# Patient Record
Sex: FEMALE | Race: WHITE | Employment: FULL TIME | URBAN - METROPOLITAN AREA
[De-identification: names, ages, dates, MRNs, and addresses within clinical notes are randomized per-mention and may not be internally consistent; named-entity substitution may affect disease eponyms.]

---

## 2018-10-22 ENCOUNTER — HOSPITAL ENCOUNTER (OUTPATIENT)
Dept: PHYSICAL THERAPY | Age: 48
Discharge: HOME | End: 2018-10-22
Payer: COMMERCIAL

## 2018-10-22 PROCEDURE — 97162 PT EVAL MOD COMPLEX 30 MIN: CPT

## 2018-10-22 PROCEDURE — 97110 THERAPEUTIC EXERCISES: CPT

## 2018-10-22 NOTE — PROGRESS NOTES
[address] PHYSICAL THERAPY 
[address] [place] [address] [address],Sara Mohamud, [address] [facility] [address] - Phone: [phone]  Fax: [phone] PLAN OF CARE / STATEMENT OF MEDICAL NECESSITY FOR PHYSICAL THERAPY SERVICES Patient Name: Leni Soulier : 1970 Medical  
Diagnosis: B Plantar Fasciitis, R Achilles Treatment Diagnosis: Pain in both feet [M79.671, M79.672], R Achilles pain Onset Date: Chronic Referral Source: Tess Lundborg Start of Care [facility]): 10/22/2018 Prior Hospitalization: See medical history Provider #: [id number] Prior Level of Function: Complete prolonged amb, standing, stair negotiation and amb on uneven surfaces without B foot or R ankle pain or difficulty Comorbidities: Varicose Veins Medications: Verified on Patient Summary List  
The Plan of Care and following information is based on the information from the initial evaluation.  
=========================================================================================== Assessment / key information:  Pt is a 51 y/o female reporting B foot pain and R achilles pain rated 2-10/10 that has been chronic for the past 2 years. Pt reports B foot pain began with insidious onset, but R achilles pain started in May/2018 after excessive stretching of gastroc/soleus on step. Pt also reports flare up in R achilles pain 6 weeks ago after running after a person. Pt reports MD has done x-rays in past year without signs of Fx or bone spurs. Pt denies MRI. Pt reports MD has diagnosed her with microtears in R achilles. Pt reports constant bump on distal R achilles and intermittent swelling in B feet. Pt reports varicose veins L tibial crest. Pt denies LE numbness/tingling. Pt reports MD recommended firm orthotics which she wears in all shoes. Pt reports MD also recommended pt wear CAMboot on R, but she is unable to wear at work. Pt reports she wears CAMboot mostly around her house.  Pt reports sleeping in [place] B at night to decrease pain and allow her to sleep. Pain in B plantar surface of feet and R achilles increases with prolonged positioning in non weight bearing and then stand, prolonged standing/amb, stair negotiation and pressure on R achilles; decreases with ice, meds and injections. Pt reports she has had multiple injections in feet in past with improvement in pain/sxs, but she can't get any more injections at this time. Pt reports working for [employer] and completes prolonged standing/amb 5-6 days per week. Pt reports wearing boots with orthotics to work. Pt denies falls or red flags. Pt demonstrates antalgic gait with decreased stance R, reciprocal stair ascending, non reciprocal stair descending (unable to complete descent with R foot on step), increased R ankle swelling/circumferential measurements, L ankle AROM (4 DF knee ext, 12 DF knee flex, 47 PF, 22 INV, 6 EV), L ankle DF PROM (10 degrees knee ext, 14 degrees knee flex), R ankle AROM (9 DF knee ext, 11 DF knee flex, 46 PF, 19 INV, 4 EV), R ankle DF PROM (15 knee ext, 14 knee flex), great toe ext PROM (78 L, 82 R), TTP B medial longitudinal arch, plantar fascia, post tib and R achilles tendon, decreased R>L great toe strength, decreased R>L ankle strength, 2 reps R SL HR before pain occurs, 17 reps L SL HR before compensations occurs, and decreased functional mobility. FOTO Score: 47/100 
=========================================================================================== Eval Complexity: History MEDIUM  Complexity : 1-2 comorbidities / personal factors will impact the outcome/ POC ;  Examination  HIGH Complexity : 4+ Standardized tests and measures addressing body structure, function, activity limitation and / or participation in recreation ; Presentation MEDIUM Complexity : Evolving with changing characteristics ;   Decision Making MEDIUM Complexity : FOTO score of 26-74; Overall Complexity MEDIUM 
 Problem List: pain affecting function, decrease ROM, decrease strength, edema affecting function, impaired gait/ balance, decrease ADL/ functional abilitiies, decrease activity tolerance, decrease flexibility/ joint mobility and decrease transfer abilities Treatment Plan may include any combination of the following: Therapeutic exercise, Therapeutic activities, Neuromuscular re-education, Physical agent/modality, Gait/balance training, Manual therapy, Aquatic therapy, Patient education, Self Care training, Functional mobility training, Home safety training, Stair training and Other: Dry Needling prn Patient / Family readiness to learn indicated by: asking questions, trying to perform skills and interest 
Persons(s) to be included in education: patient (P) Barriers to Learning/Limitations: None Measures taken:   
Patient Goal (s): \"Be pain free\" Patient self reported health status: good Rehabilitation Potential: good ? Short Term Goals: To be accomplished in  2  weeks: 1. Pt to demonstrate independence with HEP to improve foot intrinsic/great toe/ankle strength for amb, standing. 2. Pt to report 25% or > decrease in max pain levels to improve functional mobility for amb, standing and stair negotiation. ? Long Term Goals: To be accomplished in  4-6  weeks: 1. Pt to report 61/100 or > on FOTO scores to improve functional mobility for amb, standing and stair negotiation. 2. Pt to demonstrate 20 reps SL HR B to improve ankle strength for amb and stair negotiation. 3. Pt to demonstrate 10 reps 6 inch lat tap down B with good mechanics, no pain increase to improve ankle strength/stability for reciprocal descending of stairs. Frequency / Duration:   Patient to be seen  2  times per week for 4-6  weeks:  Pt reports she will be out of town for 1 week due to family commitment. Patient / Caregiver education and instruction: self care, activity modification and exercises G-Codes (GP): N/A 
 Therapist Signature: Quang Day PT Date: 10/22/2018 Certification Period: N/A Time: 9:59 AM  
=========================================================================================== I certify that the above Physical Therapy Services are being furnished while the patient is under my care. I agree with the treatment plan and certify that this therapy is necessary. Physician Signature:       Date:      Time:  Please sign and return to [facility] at [facility], [facility]. or you may fax the signed copy to [phone]. Thank you.

## 2018-10-22 NOTE — PROGRESS NOTES
PHYSICAL THERAPY - DAILY TREATMENT NOTE Patient Name: Анна Parker        Date: 10/22/2018 : 1970   yes Patient  Verified Visit #:      of   12  Insurance: Payor: BLUE CROSS / Plan: [facility] BLUE CROSS FEDERAL / Product Type: PPO / In time: 9:03 Out time: 9:59 Total Treatment Time: 64 Medicare/BCBS Time Tracking (below) Total Timed Codes (min):  15 1:1 Treatment Time:  64 TREATMENT AREA =  Pain in both feet [M79.671, M79.672] SUBJECTIVE Pain Level (on 0 to 10 scale):  3  / 10 Medication Changes/New allergies or changes in medical history, any new surgeries or procedures?    no  If yes, update Summary List  
Subjective Functional Status/Changes:  []  No changes reported See initial eval  
 
  
 
OBJECTIVE 15 min Therapeutic Exercise:  [x]  See flow sheet Rationale:      increase strength to improve the patients ability to complete amb, standing, stair negotiation. N/A min Patient Education:  yes  Reviewed HEP []  Progressed/Changed HEP based on:  Pt instructed on and given HEP to be completed daily. Reviewed importance of supportive footwear and using CAMboot as instructed by MD on R. Pt educated on red flags and to seek immediate medical attention/911 if those occur. Pt instructed to avoid plyometrics/running, ballistic or forceful stretching and amb on uneven surfaces. Pt instructed to negotiate stairs with non reciprocal pattern at this time. Pt instructed to ice prn 10-15 minutes on /1 hour off. Reviewed POC and goals. Pt reports understanding. Other Objective/Functional Measures: 
 
See initial eval 
  
Post Treatment Pain Level (on 0 to 10) scale:   2   10 ASSESSMENT Assessment/Changes in Function:  
 
See initial eval 
  
[]  See Progress Note/Recertification Patient will continue to benefit from skilled PT services to see initial eval  
Progress toward goals / Updated goals: 
Pt denied sharp pains or red flags with initial eval or therapeutic ex. See initial eval.  
 
PLAN [x]  Upgrade activities as tolerated yes Continue plan of care  
[]  Discharge due to :   
[x]  Other: PT 2x/week for 4-6 weeks. Therapist: Adan Terrazas PT Date: 10/22/2018 Time: 9:59 AM  
 
Future Appointments Date Time Provider Ezequiel Mccarthy 10/24/2018  3:00 PM Qian [facility]  
10/30/2018 10:30 AM Qian Terrykatie [facility]  
11/2/2018  3:30 PM Karlene Anthony, JODIE [facility]  
11/5/2018 10:00 AM Claus Huynh PTA [facility]  
11/6/2018  4:00 PM Karlene Anthony PT [facility]

## 2018-10-24 ENCOUNTER — HOSPITAL ENCOUNTER (OUTPATIENT)
Dept: PHYSICAL THERAPY | Age: 48
Discharge: HOME | End: 2018-10-24
Payer: COMMERCIAL

## 2018-10-24 PROCEDURE — 97110 THERAPEUTIC EXERCISES: CPT

## 2018-10-24 PROCEDURE — 97140 MANUAL THERAPY 1/> REGIONS: CPT

## 2018-10-24 NOTE — PROGRESS NOTES
PHYSICAL THERAPY - DAILY TREATMENT NOTE Patient Name: Анна Parker        Date: 10/24/2018 : 1970   YES Patient  Verified Visit #:   2   of   12  Insurance: Payor: BLUE CROSS / [facility] / Product Type: PPO / In time: 3 Out time: 350 Total Treatment Time: 50 Medicare Time Tracking (below) Total Timed Codes (min):  50 1:1 Treatment Time:    
TREATMENT AREA =  Pain in both feet [M79.671, M79.672] SUBJECTIVE Pain Level (on 0 to 10 scale):  3  / 10 Medication Changes/New allergies or changes in medical history, any new surgeries or procedures? NO    If yes, update Summary List  
Subjective Functional Status/Changes:  []  No changes reported \"Don't touch my (R) achilles. \"  Pt reporting mild pain in the bottom of both feet, but a lot of pain in the (R) achilles - especially to touch. OBJECTIVE 20 min Therapeutic Exercise:  [x]  See flow sheet Rationale:      increase ROM, increase strength, improve coordination and improve balance to improve the patients ability to perform pain free ADLs. 30 Min Manual Therapy: STM/DTM (B) gastroc/soleus, plantar fascia. TC distraction. Rationale:      decrease pain, increase ROM, increase tissue extensibility and decrease trigger points to improve patient's ability to perform pain free ADLs. min Patient Education:  YES  Reviewed HEP []  Progressed/Changed HEP based on: Other Objective/Functional Measures: Therex per flow sheet. Added FHB disassociation to improve LE coordination and strength for ADLs. Post Treatment Pain Level (on 0 to 10) scale:   2  / 10 ASSESSMENT Assessment/Changes in Function: No exacerbation of symptoms with today's session. []  See Progress Note/Recertification Patient will continue to benefit from skilled PT services to modify and progress therapeutic interventions, address functional mobility deficits, address ROM deficits, address strength deficits, analyze and address soft tissue restrictions, analyze and cue movement patterns, analyze and modify body mechanics/ergonomics and assess and modify postural abnormalities to attain remaining goals. Progress toward goals / Updated goals: 
Initiated therex. PLAN [x]  Upgrade activities as tolerated YES Continue plan of care  
[]  Discharge due to :   
[]  Other:   
 
Therapist: Betzy Callahan PTA Date: 10/24/2018 Time: 3:35 PM  
 
Future Appointments Date Time Provider Ezequiel Mccarthy 10/30/2018 10:30 AM Leland Carrel, PTA [facility]  
11/2/2018  3:30 PM Jersey Sinclair, PT [facility]  
11/5/2018 10:00 AM Leland Carrel, PTA [facility]  
11/6/2018  4:00 PM Jersey Sinclair, PT [facility]

## 2018-10-30 ENCOUNTER — HOSPITAL ENCOUNTER (OUTPATIENT)
Dept: PHYSICAL THERAPY | Age: 48
Discharge: HOME | End: 2018-10-30
Payer: COMMERCIAL

## 2018-10-30 PROCEDURE — 97110 THERAPEUTIC EXERCISES: CPT

## 2018-10-30 PROCEDURE — 97140 MANUAL THERAPY 1/> REGIONS: CPT

## 2018-10-30 NOTE — PROGRESS NOTES
PHYSICAL THERAPY - DAILY TREATMENT NOTE Patient Name: Анна Parker        Date: 10/30/2018 : 1970   YES Patient  Verified Visit #:   3   of   12  Insurance: Payor: BLUE CROSS / Plan: [facility] BLUE CROSS FEDERAL / Product Type: PPO / In time: 1025 Out time: 1125 Total Treatment Time: 60 Medicare Time Tracking (below) Total Timed Codes (min):  60 1:1 Treatment Time:  40 TREATMENT AREA =  Pain in both feet [M79.671, M79.672] SUBJECTIVE Pain Level (on 0 to 10 scale):  3  / 10 Medication Changes/New allergies or changes in medical history, any new surgeries or procedures? NO    If yes, update Summary List  
Subjective Functional Status/Changes:  []  No changes reported Pt states she feels like it's tight lately. OBJECTIVE 
30 (10) min Therapeutic Exercise:  [x]  See flow sheet Rationale:      increase ROM, increase strength, improve coordination and improve balance to improve the patients ability to perform pain free ADLs. 30 Min Manual Therapy: STM/DTM and IASTM (B) gastroc/soleus. Rationale:      decrease pain, increase ROM, increase tissue extensibility and decrease trigger points to improve patient's ability to perform pain free ADLs. min Patient Education:  YES  Reviewed HEP []  Progressed/Changed HEP based on: Other Objective/Functional Measures: Therex per flow sheet. Ankle DF = (R) 14 deg, (L) 12 deg Post Treatment Pain Level (on 0 to 10) scale:   1  / 10 ASSESSMENT Assessment/Changes in Function: No increase in pain throughout session. []  See Progress Note/Recertification Patient will continue to benefit from skilled PT services to modify and progress therapeutic interventions, address functional mobility deficits, address ROM deficits, address strength deficits, analyze and address soft tissue restrictions, analyze and cue movement patterns, analyze and modify body mechanics/ergonomics and assess and modify postural abnormalities to attain remaining goals. Progress toward goals / Updated goals: 
Progressing toward ROM goals. PLAN [x]  Upgrade activities as tolerated YES Continue plan of care  
[]  Discharge due to :   
[]  Other:   
 
Therapist: Frantz Louis PTA Date: 10/30/2018 Time: 11:42 AM  
 
Future Appointments Date Time Provider Ezequiel Mccarthy 11/2/2018  3:30 PM Izaiah Vieyra PT [facility]  
11/5/2018 10:00 AM Lasha Leach PTA [facility]  
11/6/2018  4:00 PM Izaiah Vieyra, PT [facility]

## 2018-11-06 ENCOUNTER — HOSPITAL ENCOUNTER (OUTPATIENT)
Dept: PHYSICAL THERAPY | Age: 48
Discharge: HOME | End: 2018-11-06
Payer: COMMERCIAL

## 2018-11-06 PROCEDURE — 97110 THERAPEUTIC EXERCISES: CPT

## 2018-11-06 PROCEDURE — 97140 MANUAL THERAPY 1/> REGIONS: CPT

## 2018-11-06 NOTE — PROGRESS NOTES
PHYSICAL THERAPY - DAILY TREATMENT NOTE Patient Name: Анна Parker        Date: 2018 : 1970   YES Patient  Verified Visit #:   4   of   12  Insurance: Payor: BLUE CROSS / Plan: [employer] [facility] / Product Type: PPO / In time: 3:55 Out time: 4:48 Total Treatment Time: 48 Medicare Time Tracking (below) Total Timed Codes (min): 53 1:1 Treatment Time:  45 TREATMENT AREA =  Pain in both feet [M79.671, M79.672] SUBJECTIVE Pain Level (on 0 to 10 scale):  4  / 10 Medication Changes/New allergies or changes in medical history, any new surgeries or procedures? NO    If yes, update Summary List  
Subjective Functional Status/Changes:  []  No changes reported Gris been on my feet a lot so Im hurting OBJECTIVE 30  min Therapeutic Exercise:  [x]  See flow sheet Rationale:      increase ROM, increase strength, improve coordination and improve balance to improve the patients ability to perform pain free ADLs.   
10 Min Manual Therapy: Midfoot mob, shot gun tech Rationale:      decrease pain, increase ROM, increase tissue extensibility and decrease trigger points to improve patient's ability to perform pain free ADLs. Dry Needling Procedure Note Dry Needle Session Number:  1 Procedure: An intramuscular manual therapy (dry needling) and a neuro-muscular re-education treatment was done to deactivate myofascial trigger points, with a 15/30 gauge solid filament needle, under aseptic technique. Indication(s): [x] Muscle spasms [x] Myalgia/Myositis  [] Muscle cramps  
   [] Muscle imbalances [] TMD (TMJ) [] Myofascial pain & dysfunction 
   [] Other: __ Chart reviewed for the following: 
IDanielle PT, have reviewed the medical history, summary list and precautions/contraindications for Анна Parker. TIME OUT performed immediately prior to start of procedure: 
4:05 (enter time the timeout was completed) Jessica EVANS, PT, have performed the following reviews on April Spillman prior to the start of the session:     
[x] Patient was identified by name and date of birth   
[x] Agreement on all muscles being treated was verified  
[x] Purpose of dry needling, side effects, possible complications, risks and benefits were explained to the patient [x] Procedure site(s) verified 
[x] Patient was positioned for comfort and draped for privacy [x] Informed Consent was signed (initial visit) and verified verbally (subsequent visits) [x] Patient was instructed on the need to report the use of blood thinners and/or immunosuppressant medications [x] How to respond to possible adverse effects of treatment 
[x] Self treatment of post needling soreness: ice, heat (moist heat, heat wraps) and stretching 
[x] Opportunity was given to ask any questions, all questions were answered Treatment: The following muscles were treated today: 
 
Right: gastroc/soleus Left: gastroc/soleus Patients response to todays treatment:  
[]  LTRs  []  Muscle Relaxation  []  Pain Relief  []  Decreased Tinnitus 
[]  Decreased HAs []  Post needling soreness []  Increased ROM []  Other:   
 
Actual needle insertion time is not billed  
 min Patient Education:  YES  Reviewed HEP []  Progressed/Changed HEP based on: Other Objective/Functional Measures: 
 
Sig increase in soft tissue tension noted at R med gastroc Post Treatment Pain Level (on 0 to 10) scale:   0  / 10 ASSESSMENT Assessment/Changes in Function:  
 
Good svetlana to all Rx without increase in pain  
  
[]  See Progress Note/Recertification Patient will continue to benefit from skilled PT services to modify and progress therapeutic interventions, address functional mobility deficits, address ROM deficits, address strength deficits, analyze and address soft tissue restrictions, analyze and cue movement patterns, analyze and modify body mechanics/ergonomics and assess and modify postural abnormalities to attain remaining goals. Progress toward goals / Updated goals: No sig change towards LTGs PLAN 
[]  Upgrade activities as tolerated YES Continue plan of care  
[]  Discharge due to :   
[]  Other:   
 
Therapist: Mercedes Li, PT, OCS, SCS, CSCS Date: 11/6/2018 Time: 10:01 AM  
 
 
Future Appointments Date Time Provider Ezequiel Mccarthy 11/6/2018  4:00 PM Chaim Johnson, JODIE [facility]

## 2018-12-18 NOTE — PROGRESS NOTES
[facility] [address] [place] [address] THERAPY  [address], [address] [place] [address] - Phone: [phone]  Fax: [phone]    DISCHARGE NOTE  Patient Name: Анна Parker : 1970   Treatment/Medical Diagnosis: Pain in both feet [C66.907, M79.672]   Referral Source: Christian Mario DPM     Date of Initial Visit: 10/22/2018 Attended Visits: 4 Missed Visits: 2       SUMMARY OF TREATMENT  Pt attended only initial evaluation and     3     follow-ups and then did not return. Therefore a formal reassessment of goals was not performed. RECOMMENDATIONS  Discontinue physical therapy due to patient not returning. If you have any questions/comments please contact us directly at [phone]. Thank you for allowing us to assist in the care of your patient.     Therapist Signature: Lars Cuevas, PT, OCS, CSCS, CSCS Date: 2018     Time: 5:00 PM